# Patient Record
Sex: MALE | Race: WHITE | URBAN - METROPOLITAN AREA
[De-identification: names, ages, dates, MRNs, and addresses within clinical notes are randomized per-mention and may not be internally consistent; named-entity substitution may affect disease eponyms.]

---

## 2017-06-19 DIAGNOSIS — I71.40 ABDOMINAL AORTIC ANEURYSM (H): Primary | ICD-10-CM

## 2017-08-17 ENCOUNTER — OFFICE VISIT (OUTPATIENT)
Dept: VASCULAR SURGERY | Facility: CLINIC | Age: 82
End: 2017-08-17

## 2017-08-17 VITALS
DIASTOLIC BLOOD PRESSURE: 81 MMHG | OXYGEN SATURATION: 97 % | RESPIRATION RATE: 16 BRPM | HEART RATE: 59 BPM | SYSTOLIC BLOOD PRESSURE: 133 MMHG

## 2017-08-17 DIAGNOSIS — I71.40 ABDOMINAL AORTIC ANEURYSM (AAA) WITHOUT RUPTURE (H): Primary | ICD-10-CM

## 2017-08-17 RX ORDER — LORAZEPAM 0.5 MG/1
TABLET ORAL
COMMUNITY

## 2017-08-17 RX ORDER — PANTOPRAZOLE SODIUM 40 MG/1
TABLET, DELAYED RELEASE ORAL
COMMUNITY

## 2017-08-17 RX ORDER — LEVOTHYROXINE SODIUM 112 UG/1
TABLET ORAL
COMMUNITY

## 2017-08-17 RX ORDER — TAMSULOSIN HYDROCHLORIDE 0.4 MG/1
CAPSULE ORAL
COMMUNITY

## 2017-08-17 RX ORDER — SERTRALINE HYDROCHLORIDE 100 MG/1
TABLET, FILM COATED ORAL
COMMUNITY

## 2017-08-17 RX ORDER — ATORVASTATIN CALCIUM 10 MG/1
TABLET, FILM COATED ORAL
COMMUNITY

## 2017-08-17 ASSESSMENT — PAIN SCALES - GENERAL: PAINLEVEL: NO PAIN (0)

## 2017-08-17 NOTE — NURSING NOTE
Chief Complaint   Patient presents with     RECHECK     Annual follow up AAA        Vitals:    08/17/17 1444   BP: 133/81   BP Location: Left arm   Pulse: 59   Resp: 16   SpO2: 97%       There is no height or weight on file to calculate BMI.               Patti Rea LPN

## 2017-08-17 NOTE — LETTER
8/17/2017       RE: Parviz Valentin  [address] [facility] [address]     Dear Colleague,    Thank you for referring your patient, Parviz Valentin, to the [facility] at [facility]. Please see a copy of my visit note below.    VASCULAR SURGERY CLINIC ESTABLISHED PATIENT NOTE    HPI:   Mr. Valentin is a 83- year old male who presents today for routine surveillance status post endovascular abdominal aortic aneurysm repair in November 2015.     SUBJECTIVE: Since we last saw Mr. Valentin in 2016 he endorses doing well. He fractured his left hip in November 2016, however continues to drive his car and live independently. He offers no specific complaints.     OBJECTIVE:  Vital signs:  133/81  59  16  Prior to Admission medications    Medication Sig Start Date End Date Taking? Authorizing Provider   atorvastatin (LIPITOR) 10 MG tablet     Reported, Patient   levothyroxine (SYNTHROID/LEVOTHROID) 112 MCG tablet     Reported, Patient   LORazepam (ATIVAN) 0.5 MG tablet     Reported, Patient   omeprazole (PRILOSEC) 20 MG CR capsule     Reported, Patient   pantoprazole (PROTONIX) 40 MG EC tablet     Reported, Patient   sertraline (ZOLOFT) 100 MG tablet     Reported, Patient   tamsulosin (FLOMAX) 0.4 MG capsule     Reported, Patient   aspirin 81 MG tablet     Reported, Patient     PHYSICAL EXAM:  NEURO/PSYCH: The patient is alert and oriented.  Appropriate.  Moves all extremities.   SKIN: Color appropriate for race, warm, dry.  PULMONARY: Does not require supplemental oxygen; no acute distress.   EXTREMITIES: Appears well-perfused.     8/17/2017 CT :  Scan reviewed by Dr. Beltran, looks good, no endoleaks, graft in good position    ASSESSMENT/PLAN:  #1 Abdominal aortic aneurysm, without rupture  #2 Status post endovascular aortic aneurysm repair, 11/2015  - follow up non-contrast CT in one year with vascular APRN  - continue ASA and Lipitor    ANTI-PLATELET: Aspirin  ANTI-COAGULANT: Not  warranted  STATIN: Atorvastatin 10 mg  DIABETES MEDICATIONS: Not diabetic  TOBACCO CESSATION: Non-smoker    Please contact Dr. Beltran's Vascular Surgery Service with questions or concerns.  Ana MENDEZ, CNS  Division of Vascular Surgery  [facility]  Pager [phone]    I have seen and assessed this patient with the above provider and agree with her above documentation, impression and plan.  Good medium term outcome post EVAr.  CT shows no growth of AAA and no endoleak.  See back in 1 year with non-contrast CT ans AAA duplex.      I spent>50% of this 15 min visit in counseling.  Again, thank you for allowing me to participate in the care of your patient.    Sincerely,  Nora Beltran MD

## 2017-08-17 NOTE — PROGRESS NOTES
VASCULAR SURGERY CLINIC ESTABLISHED PATIENT NOTE    HPI:   Mr. Valentin is a 83- year old male who presents today for routine surveillance status post endovascular abdominal aortic aneurysm repair in November 2015.     SUBJECTIVE: Since we last saw Mr. Valentin in 2016 he endorses doing well. He fractured his left hip in November 2016, however continues to drive his car and live independently. He offers no specific complaints.     OBJECTIVE:  Vital signs:  133/81  59  16    Prior to Admission medications    Medication Sig Start Date End Date Taking? Authorizing Provider   atorvastatin (LIPITOR) 10 MG tablet     Reported, Patient   levothyroxine (SYNTHROID/LEVOTHROID) 112 MCG tablet     Reported, Patient   LORazepam (ATIVAN) 0.5 MG tablet     Reported, Patient   omeprazole (PRILOSEC) 20 MG CR capsule     Reported, Patient   pantoprazole (PROTONIX) 40 MG EC tablet     Reported, Patient   sertraline (ZOLOFT) 100 MG tablet     Reported, Patient   tamsulosin (FLOMAX) 0.4 MG capsule     Reported, Patient   aspirin 81 MG tablet     Reported, Patient       PHYSICAL EXAM:  NEURO/PSYCH: The patient is alert and oriented.  Appropriate.  Moves all extremities.   SKIN: Color appropriate for race, warm, dry.  PULMONARY: Does not require supplemental oxygen; no acute distress.   EXTREMITIES: Appears well-perfused.     8/17/2017 CT :  Scan reviewed by Dr. Beltran, looks good, no endoleaks, graft in good position    ASSESSMENT/PLAN:  #1 Abdominal aortic aneurysm, without rupture  #2 Status post endovascular aortic aneurysm repair, 11/2015  - follow up non-contrast CT in one year with vascular APRN  - continue ASA and Lipitor      ANTI-PLATELET: Aspirin  ANTI-COAGULANT: Not warranted  STATIN: Atorvastatin 10 mg  DIABETES MEDICATIONS: Not diabetic  TOBACCO CESSATION: Non-smoker    Please contact Dr. Beltran's Vascular Surgery Service with questions or concerns.    Ana MENDEZ, CNS  Division of Vascular Surgery  [facility]  [place]  Pager [phone]

## 2017-08-21 NOTE — PROGRESS NOTES
I have seen and assessed this patient with the above provider and agree with her above documentation, impression and plan.  Good medium term outcome post EVAr.  CT shows no growth of AAA and no endoleak.  See back in 1 year with non-contrast CT ans AAA duplex.      I spent>50% of this 15 min visit in counseling.

## 2022-06-20 PROCEDURE — 88305 TISSUE EXAM BY PATHOLOGIST: CPT | Mod: TC,ORL | Performed by: STUDENT IN AN ORGANIZED HEALTH CARE EDUCATION/TRAINING PROGRAM

## 2022-06-22 ENCOUNTER — LAB REQUISITION (OUTPATIENT)
Dept: LAB | Facility: CLINIC | Age: 87
End: 2022-06-22
Payer: MEDICARE

## 2022-06-23 LAB
PATH REPORT.COMMENTS IMP SPEC: ABNORMAL
PATH REPORT.COMMENTS IMP SPEC: ABNORMAL
PATH REPORT.COMMENTS IMP SPEC: YES
PATH REPORT.FINAL DX SPEC: ABNORMAL
PATH REPORT.GROSS SPEC: ABNORMAL
PATH REPORT.MICROSCOPIC SPEC OTHER STN: ABNORMAL
PATH REPORT.RELEVANT HX SPEC: ABNORMAL

## 2022-06-23 PROCEDURE — 88305 TISSUE EXAM BY PATHOLOGIST: CPT | Mod: 26 | Performed by: DERMATOLOGY
